# Patient Record
Sex: FEMALE | Race: WHITE | NOT HISPANIC OR LATINO | URBAN - METROPOLITAN AREA
[De-identification: names, ages, dates, MRNs, and addresses within clinical notes are randomized per-mention and may not be internally consistent; named-entity substitution may affect disease eponyms.]

---

## 2023-08-21 ENCOUNTER — HOSPITAL ENCOUNTER (OUTPATIENT)
Dept: DATA CONVERSION | Facility: HOSPITAL | Age: 83
End: 2023-08-21

## 2023-08-21 DIAGNOSIS — I34.1 NONRHEUMATIC MITRAL (VALVE) PROLAPSE: ICD-10-CM

## 2024-02-09 ENCOUNTER — HOSPITAL ENCOUNTER (OUTPATIENT)
Dept: RADIOLOGY | Facility: HOSPITAL | Age: 84
Discharge: HOME | End: 2024-02-09
Payer: MEDICARE

## 2024-02-09 DIAGNOSIS — Z78.0 ASYMPTOMATIC MENOPAUSAL STATE: ICD-10-CM

## 2024-02-09 PROCEDURE — 77085 DXA BONE DENSITY AXL VRT FX: CPT

## 2024-02-12 ENCOUNTER — APPOINTMENT (OUTPATIENT)
Dept: CARDIOLOGY | Facility: HOSPITAL | Age: 84
End: 2024-02-12
Payer: MEDICARE

## 2024-05-24 ENCOUNTER — HOSPITAL ENCOUNTER (OUTPATIENT)
Dept: RADIOLOGY | Facility: HOSPITAL | Age: 84
Discharge: HOME | End: 2024-05-24
Payer: MEDICARE

## 2024-05-24 DIAGNOSIS — R10.11 RIGHT UPPER QUADRANT PAIN: ICD-10-CM

## 2024-05-24 PROCEDURE — 76700 US EXAM ABDOM COMPLETE: CPT | Performed by: RADIOLOGY

## 2024-05-24 PROCEDURE — 76700 US EXAM ABDOM COMPLETE: CPT

## 2024-07-24 ENCOUNTER — HOSPITAL ENCOUNTER (EMERGENCY)
Facility: HOSPITAL | Age: 84
Discharge: HOME | End: 2024-07-24
Attending: EMERGENCY MEDICINE
Payer: MEDICARE

## 2024-07-24 ENCOUNTER — APPOINTMENT (OUTPATIENT)
Dept: RADIOLOGY | Facility: HOSPITAL | Age: 84
End: 2024-07-24
Payer: MEDICARE

## 2024-07-24 ENCOUNTER — APPOINTMENT (OUTPATIENT)
Dept: CARDIOLOGY | Facility: HOSPITAL | Age: 84
End: 2024-07-24
Payer: MEDICARE

## 2024-07-24 VITALS
HEART RATE: 68 BPM | HEIGHT: 61 IN | DIASTOLIC BLOOD PRESSURE: 67 MMHG | RESPIRATION RATE: 18 BRPM | OXYGEN SATURATION: 98 % | SYSTOLIC BLOOD PRESSURE: 127 MMHG | TEMPERATURE: 98.1 F | BODY MASS INDEX: 19.83 KG/M2 | WEIGHT: 105 LBS

## 2024-07-24 DIAGNOSIS — R06.00 DYSPNEA, UNSPECIFIED TYPE: Primary | ICD-10-CM

## 2024-07-24 DIAGNOSIS — R53.1 GENERALIZED WEAKNESS: ICD-10-CM

## 2024-07-24 LAB
ALBUMIN SERPL-MCNC: 3.9 G/DL (ref 3.5–5)
ALP BLD-CCNC: 63 U/L (ref 35–125)
ALT SERPL-CCNC: 22 U/L (ref 5–40)
ANION GAP SERPL CALC-SCNC: 12 MMOL/L
APPEARANCE UR: CLEAR
AST SERPL-CCNC: 21 U/L (ref 5–40)
BASOPHILS # BLD AUTO: 0.02 X10*3/UL (ref 0–0.1)
BASOPHILS NFR BLD AUTO: 0.3 %
BILIRUB SERPL-MCNC: 0.3 MG/DL (ref 0.1–1.2)
BILIRUB UR STRIP.AUTO-MCNC: NEGATIVE MG/DL
BUN SERPL-MCNC: 31 MG/DL (ref 8–25)
CALCIUM SERPL-MCNC: 9.5 MG/DL (ref 8.5–10.4)
CHLORIDE SERPL-SCNC: 96 MMOL/L (ref 97–107)
CO2 SERPL-SCNC: 26 MMOL/L (ref 24–31)
COLOR UR: ABNORMAL
CREAT SERPL-MCNC: 1.2 MG/DL (ref 0.4–1.6)
EGFRCR SERPLBLD CKD-EPI 2021: 44 ML/MIN/1.73M*2
EOSINOPHIL # BLD AUTO: 0.12 X10*3/UL (ref 0–0.4)
EOSINOPHIL NFR BLD AUTO: 1.9 %
ERYTHROCYTE [DISTWIDTH] IN BLOOD BY AUTOMATED COUNT: 13.3 % (ref 11.5–14.5)
GLUCOSE SERPL-MCNC: 85 MG/DL (ref 65–99)
GLUCOSE UR STRIP.AUTO-MCNC: NORMAL MG/DL
HCT VFR BLD AUTO: 42.6 % (ref 36–46)
HGB BLD-MCNC: 14.5 G/DL (ref 12–16)
IMM GRANULOCYTES # BLD AUTO: 0.06 X10*3/UL (ref 0–0.5)
IMM GRANULOCYTES NFR BLD AUTO: 0.9 % (ref 0–0.9)
KETONES UR STRIP.AUTO-MCNC: NEGATIVE MG/DL
LACTATE BLDV-SCNC: 1.5 MMOL/L (ref 0.4–2)
LACTATE BLDV-SCNC: 2.2 MMOL/L (ref 0.4–2)
LEUKOCYTE ESTERASE UR QL STRIP.AUTO: ABNORMAL
LYMPHOCYTES # BLD AUTO: 1.99 X10*3/UL (ref 0.8–3)
LYMPHOCYTES NFR BLD AUTO: 31 %
MAGNESIUM SERPL-MCNC: 2.2 MG/DL (ref 1.6–3.1)
MCH RBC QN AUTO: 32.2 PG (ref 26–34)
MCHC RBC AUTO-ENTMCNC: 34 G/DL (ref 32–36)
MCV RBC AUTO: 95 FL (ref 80–100)
MONOCYTES # BLD AUTO: 0.87 X10*3/UL (ref 0.05–0.8)
MONOCYTES NFR BLD AUTO: 13.6 %
NEUTROPHILS # BLD AUTO: 3.36 X10*3/UL (ref 1.6–5.5)
NEUTROPHILS NFR BLD AUTO: 52.3 %
NITRITE UR QL STRIP.AUTO: NEGATIVE
NRBC BLD-RTO: 0 /100 WBCS (ref 0–0)
NT-PROBNP SERPL-MCNC: 167 PG/ML (ref 0–624)
PH UR STRIP.AUTO: 6.5 [PH]
PLATELET # BLD AUTO: 241 X10*3/UL (ref 150–450)
POTASSIUM SERPL-SCNC: 4.1 MMOL/L (ref 3.4–5.1)
PROT SERPL-MCNC: 6.4 G/DL (ref 5.9–7.9)
PROT UR STRIP.AUTO-MCNC: NEGATIVE MG/DL
RBC # BLD AUTO: 4.51 X10*6/UL (ref 4–5.2)
RBC # UR STRIP.AUTO: NEGATIVE /UL
RBC #/AREA URNS AUTO: NORMAL /HPF
SARS-COV-2 RNA RESP QL NAA+PROBE: NOT DETECTED
SODIUM SERPL-SCNC: 134 MMOL/L (ref 133–145)
SP GR UR STRIP.AUTO: 1.03
TROPONIN T SERPL-MCNC: 13 NG/L
TROPONIN T SERPL-MCNC: 14 NG/L
UROBILINOGEN UR STRIP.AUTO-MCNC: NORMAL MG/DL
WBC # BLD AUTO: 6.4 X10*3/UL (ref 4.4–11.3)
WBC #/AREA URNS AUTO: NORMAL /HPF

## 2024-07-24 PROCEDURE — 87635 SARS-COV-2 COVID-19 AMP PRB: CPT | Performed by: CLINICAL NURSE SPECIALIST

## 2024-07-24 PROCEDURE — 71275 CT ANGIOGRAPHY CHEST: CPT | Performed by: RADIOLOGY

## 2024-07-24 PROCEDURE — 93005 ELECTROCARDIOGRAM TRACING: CPT

## 2024-07-24 PROCEDURE — 87075 CULTR BACTERIA EXCEPT BLOOD: CPT | Mod: WESLAB,59 | Performed by: CLINICAL NURSE SPECIALIST

## 2024-07-24 PROCEDURE — 36415 COLL VENOUS BLD VENIPUNCTURE: CPT | Performed by: CLINICAL NURSE SPECIALIST

## 2024-07-24 PROCEDURE — 2550000001 HC RX 255 CONTRASTS: Performed by: EMERGENCY MEDICINE

## 2024-07-24 PROCEDURE — 81001 URINALYSIS AUTO W/SCOPE: CPT | Performed by: CLINICAL NURSE SPECIALIST

## 2024-07-24 PROCEDURE — 85025 COMPLETE CBC W/AUTO DIFF WBC: CPT | Performed by: CLINICAL NURSE SPECIALIST

## 2024-07-24 PROCEDURE — 80053 COMPREHEN METABOLIC PANEL: CPT | Performed by: CLINICAL NURSE SPECIALIST

## 2024-07-24 PROCEDURE — 87086 URINE CULTURE/COLONY COUNT: CPT | Mod: WESLAB | Performed by: CLINICAL NURSE SPECIALIST

## 2024-07-24 PROCEDURE — 83735 ASSAY OF MAGNESIUM: CPT | Performed by: CLINICAL NURSE SPECIALIST

## 2024-07-24 PROCEDURE — 84484 ASSAY OF TROPONIN QUANT: CPT | Performed by: CLINICAL NURSE SPECIALIST

## 2024-07-24 PROCEDURE — 99285 EMERGENCY DEPT VISIT HI MDM: CPT | Mod: 25

## 2024-07-24 PROCEDURE — 71046 X-RAY EXAM CHEST 2 VIEWS: CPT | Performed by: RADIOLOGY

## 2024-07-24 PROCEDURE — 87040 BLOOD CULTURE FOR BACTERIA: CPT | Mod: WESLAB | Performed by: CLINICAL NURSE SPECIALIST

## 2024-07-24 PROCEDURE — 83605 ASSAY OF LACTIC ACID: CPT | Performed by: CLINICAL NURSE SPECIALIST

## 2024-07-24 PROCEDURE — 71046 X-RAY EXAM CHEST 2 VIEWS: CPT

## 2024-07-24 PROCEDURE — 83880 ASSAY OF NATRIURETIC PEPTIDE: CPT | Performed by: CLINICAL NURSE SPECIALIST

## 2024-07-24 PROCEDURE — 71275 CT ANGIOGRAPHY CHEST: CPT

## 2024-07-24 ASSESSMENT — LIFESTYLE VARIABLES
HAVE YOU EVER FELT YOU SHOULD CUT DOWN ON YOUR DRINKING: NO
EVER HAD A DRINK FIRST THING IN THE MORNING TO STEADY YOUR NERVES TO GET RID OF A HANGOVER: NO
EVER FELT BAD OR GUILTY ABOUT YOUR DRINKING: NO
HAVE PEOPLE ANNOYED YOU BY CRITICIZING YOUR DRINKING: NO
TOTAL SCORE: 0

## 2024-07-24 ASSESSMENT — PAIN SCALES - GENERAL: PAINLEVEL_OUTOF10: 0 - NO PAIN

## 2024-07-24 ASSESSMENT — COLUMBIA-SUICIDE SEVERITY RATING SCALE - C-SSRS
6. HAVE YOU EVER DONE ANYTHING, STARTED TO DO ANYTHING, OR PREPARED TO DO ANYTHING TO END YOUR LIFE?: NO
2. HAVE YOU ACTUALLY HAD ANY THOUGHTS OF KILLING YOURSELF?: NO
1. IN THE PAST MONTH, HAVE YOU WISHED YOU WERE DEAD OR WISHED YOU COULD GO TO SLEEP AND NOT WAKE UP?: NO

## 2024-07-24 ASSESSMENT — PAIN - FUNCTIONAL ASSESSMENT: PAIN_FUNCTIONAL_ASSESSMENT: 0-10

## 2024-07-24 NOTE — ED PROVIDER NOTES
Department of Emergency Medicine   ED  Provider Note  Admit Date/RoomTime: 7/24/2024  1:43 PM  ED Room: 12/12        History of Present Illness:  Chief Complaint   Patient presents with    Weakness, Gen    Shortness of Breath         Erin Calabrese is a 83 y.o. female acid reflux presents to the emergency department complaints of shortness of breath generalized weakness.  Patient states she was seen evaluated by her primary care physician last week for back pain.  Took Toradol and the back pain resolved.  Then she started having shortness of breath where she had to force herself to move.  Is progressively gotten worse.  She had a decrease of her appetite last week but felt hungry couple days ago and then today woke up and had extreme shortness of breath extreme weakness and she had to force herself to eat.  She denies chest pain no itchy watery eyes runny nose sore throat complains of bilateral ears feeling clogged.  No abdominal pain nausea or vomiting.  No pressure burning or frequency of urination.  Patient reports that she has not eaten well so she has had a 5 pound weight loss.  Denies any swelling in her arms or legs.  Patient started to get sick on July 6 states that she had COVID at that time.  She has not bounced back to her normal state of health since then.  Per family    Review of Systems:   Pertinent positives and negatives are stated within HPI, all other systems reviewed and are negative.        --------------------------------------------- PAST HISTORY ---------------------------------------------  Past Medical History:  has a past medical history of Other specified health status.  Past Surgical History:  has a past surgical history that includes Other surgical history (11/04/2022).  Social History:    Family History: family history is not on file.. Unless otherwise noted, family history is non contributory  The patient’s home medications have been reviewed.  Allergies: Patient has no known  "allergies.        ---------------------------------------------------PHYSICAL EXAM--------------------------------------    GENERAL APPEARANCE: Awake and alert.   VITAL SIGNS: As per the nurses' triage record.   HEENT: Normocephalic, atraumatic. Extraocular muscles are intact. Pupils equal round and reactive to light. Conjunctiva are pink. Negative scleral icterus. Mucous membranes are moist. Tongue in the midline. Pharynx was without erythema or exudates, uvula midline left panic membrane pearly gray and intact right tympanic membrane dull and intact.  NECK: Soft Nontender and supple, full gross ROM, no meningeal signs.  CHEST: Nontender to palpation. Clear to auscultation bilaterally. No rales, rhonchi, or wheezing.  Tachypnea noted with talking but able to talk in complete sentence no use of accessory muscles or nasal flaring noted no pursed of breathing or tripod position noted.  HEART: S1, S2. Regular rate and rhythm. No murmurs, gallops or rubs.  Strong and equal pulses in the extremities.   ABDOMEN: Soft, nontender, nondistended, positive bowel sounds, no palpable masses.  MUSCULCSKELETAL: The calves are nontender to palpation. Full gross active range of motion.  No peripheral edema noted.  NEUROLOGICAL: Awake, alert and oriented x 3. Power intact in the upper and lower extremities. Sensation is intact to light touch in the upper and lower extremities.   IMMUNOLOGICAL: No lymphatic streaking noted   DERM: No petechiae, rashes, or ecchymoses.          ------------------------- NURSING NOTES AND VITALS REVIEWED ---------------------------  The nursing notes within the ED encounter and vital signs as below have been reviewed by myself  /73   Pulse 78   Temp 36.7 °C (98.1 °F)   Resp 16   Ht 1.549 m (5' 1\")   Wt 47.6 kg (105 lb)   SpO2 99%   BMI 19.84 kg/m²     Oxygen Saturation Interpretation: 99% room air    The cardiac monitor revealed sinus rhythm with a heart rate in the 70s as interpreted by me. " The cardiac monitor was ordered secondary to the patient's heart rate and to monitor the patient for dysrhythmia.       The patient’s available past medical records and past encounters were reviewed.          -----------------------DIAGNOSTIC RESULTS------------------------  LABS:    Labs Reviewed   CBC WITH AUTO DIFFERENTIAL - Abnormal       Result Value    WBC 6.4      nRBC 0.0      RBC 4.51      Hemoglobin 14.5      Hematocrit 42.6      MCV 95      MCH 32.2      MCHC 34.0      RDW 13.3      Platelets 241      Neutrophils % 52.3      Immature Granulocytes %, Automated 0.9      Lymphocytes % 31.0      Monocytes % 13.6      Eosinophils % 1.9      Basophils % 0.3      Neutrophils Absolute 3.36      Immature Granulocytes Absolute, Automated 0.06      Lymphocytes Absolute 1.99      Monocytes Absolute 0.87 (*)     Eosinophils Absolute 0.12      Basophils Absolute 0.02     COMPREHENSIVE METABOLIC PANEL - Abnormal    Glucose 85      Sodium 134      Potassium 4.1      Chloride 96 (*)     Bicarbonate 26      Urea Nitrogen 31 (*)     Creatinine 1.20      eGFR 44 (*)     Calcium 9.5      Albumin 3.9      Alkaline Phosphatase 63      Total Protein 6.4      AST 21      Bilirubin, Total 0.3      ALT 22      Anion Gap 12     BLOOD GAS LACTIC ACID, VENOUS - Abnormal    POCT Lactate, Venous 2.2 (*)    N-TERMINAL PROBNP - Normal    PROBNP 167      Narrative:     Reference ranges are based on clinical submission data. These ranges represent the 95th percentile of normal cut-off points. As NT Pro- BNP values approach 1000 pg/ml, clinical symptoms are more likely associated with CHF.   SARS-COV-2 PCR - Normal    Coronavirus 2019, PCR Not Detected      Narrative:     This assay has received FDA Emergency Use Authorization (EUA) and is only authorized for the duration of time that circumstances exist to justify the authorization of the emergency use of in vitro diagnostic tests for the detection of SARS-CoV-2 virus and/or diagnosis of  COVID-19 infection under section 564(b)(1) of the Act, 21 U.S.C. 360bbb-3(b)(1). This assay is an in vitro diagnostic nucleic acid amplification test for the qualitative detection of SARS-CoV-2 from nasopharyngeal specimens and has been validated for use at [facility]. Negative results do not preclude COVID-19 infections and should not be used as the sole basis for diagnosis, treatment, or other management decisions.     SERIAL TROPONIN, INITIAL ([facility]) - Normal    Troponin T, High Sensitivity 13     SERIAL TROPONIN,  2 HOUR ([facility]) - Normal    Troponin T, High Sensitivity 14     MAGNESIUM - Normal    Magnesium 2.20     BLOOD GAS LACTIC ACID, VENOUS - Normal    POCT Lactate, Venous 1.5     BLOOD CULTURE   BLOOD CULTURE   TROPONIN T SERIES, HIGH SENSITIVITY (0, 2 HR, 6 HR)    Narrative:     The following orders were created for panel order Troponin T Series, High Sensitivity (0, 2HR, 6HR).  Procedure                               Abnormality         Status                     ---------                               -----------         ------                     Serial Troponin, Initial...[[id number]]  Normal              Final result               Serial Troponin, 2 Hour ...[[id number]]  Normal              Final result               Serial Troponin, 6 Hour ...[[id number]]                                                   Please view results for these tests on the individual orders.   URINALYSIS WITH REFLEX CULTURE AND MICROSCOPIC    Narrative:     The following orders were created for panel order Urinalysis with Reflex Culture and Microscopic.  Procedure                               Abnormality         Status                     ---------                               -----------         ------                     Urinalysis with Reflex C...[[id number]]                                                 Extra Urine Gray Tube[[id number]]                                                         Please  view results for these tests on the individual orders.   URINALYSIS WITH REFLEX CULTURE AND MICROSCOPIC   EXTRA URINE GRAY TUBE       As interpreted by me, the above displayed labs are abnormal. All other labs obtained during this visit were within normal range or not returned as of this dictation.      EKG Interpretation    1400 EKG personally interpreted by me performed at 1357  Normal sinus rhythm with a ventricular rate 73 normal axis and intervals no acute ischemic changes [EF]           XR chest 2 views   Final Result   No acute cardiopulmonary process.        MACRO:   None        Signed by: Annabelle Valverde 7/24/2024 2:51 PM   Dictation workstation:   [id number]      CT angio chest for pulmonary embolism    (Results Pending)           XR chest 2 views   Final Result   No acute cardiopulmonary process.        MACRO:   None        Signed by: Annabelle Valverde 7/24/2024 2:51 PM   Dictation workstation:   [id number]      CT angio chest for pulmonary embolism    (Results Pending)           ------------------------------ ED COURSE/MEDICAL DECISION MAKING----------------------  Medical Decision Making:   Exam: A medically appropriate exam performed, outlined above, given the known history and presentation.    History obtained from: Review of medical record nursing notes patient patient family      Social Determinants of Health considered during this visit: Takes care of her self at home      PAST MEDICAL HISTORY/Chronic Conditions Affecting Care     has a past medical history of Other specified health status.       CC/HPI Summary, Social Determinants of health, Records Reviewed, DDx, testing done/not done, ED Course, Reassessment, disposition considerations/shared decision making with patient, consults, disposition:   Presents with shortness of breath generalized weakness  Plan  Chest x-ray No acute cardiopulmonary process   EKG  Blood cultures  Lactic acid  CBC  CMP  proBNP  COVID  Troponin  Urine    Medical  Decision Making/Differential Diagnosis:  Differentials include but not limited to viral illness versus pneumonia versus CHF versus acute coronary syndrome versus electrode abnormality versus dehydration versus anemia    Patient presented with shortness of breath tachypnea with talking.  Chest x-ray showed no acute cardiopulmonary process  Troponin negative no elevation white blood cell count proBNP negative EKG per attending note normal sinus rhythm no ST elevation or arrhythmia COVID-negative magnesium within normal limits electrolytes within normal limits except for chloride slightly low BUN mildly elevated creatinine at baseline LFTs within normal limits patient presenting in position of comfort.  No obvious source of her shortness of breath CT angio ordered Case discussed and patient seen evaluated with attending physician Dr. Oshea   Please see attending note for final impression disposition reevaluation she will be assuming care of patient for final plan due to the end of my shift [id number]      PROCEDURES  Unless otherwise noted below, none      CONSULTS:   None      ED Course as of 07/24/24 1755   Wed Jul 24, 2024   1400 EKG personally interpreted by me performed at 1357  Normal sinus rhythm with a ventricular rate 73 normal axis and intervals no acute ischemic changes [EF]      ED Course User Index  [EF] Astrid Oshea,          Diagnoses as of 07/24/24 1755   Dyspnea, unspecified type   Generalized weakness         This patient has remained hemodynamically stable during their ED course.      Critical Care: none        Counseling:  The emergency provider has spoken with the patient and discussed today’s results, in addition to providing specific details for the plan of care and counseling regarding the diagnosis and prognosis.  Questions are answered at this time and they are agreeable with the plan.         --------------------------------- IMPRESSION AND DISPOSITION  ---------------------------------    IMPRESSION  1. Dyspnea, unspecified type    2. Generalized weakness        DISPOSITION  Disposition:  pending  Patient condition is stable        NOTE: This report was transcribed using voice recognition software. Every effort was made to ensure accuracy; however, inadvertent computerized transcription errors may be present      Maria Del Rosario Wooten, [email or web address]  07/24/24 8911

## 2024-07-25 LAB
ATRIAL RATE: 73 BPM
BACTERIA UR CULT: NORMAL
HOLD SPECIMEN: NORMAL
P AXIS: 79 DEGREES
P OFFSET: 208 MS
P ONSET: 153 MS
PR INTERVAL: 144 MS
Q ONSET: 225 MS
QRS COUNT: 12 BEATS
QRS DURATION: 82 MS
QT INTERVAL: 392 MS
QTC CALCULATION(BAZETT): 431 MS
QTC FREDERICIA: 418 MS
R AXIS: 46 DEGREES
T AXIS: 64 DEGREES
T OFFSET: 421 MS
VENTRICULAR RATE: 73 BPM

## 2024-07-28 LAB
BACTERIA BLD CULT: NORMAL
BACTERIA BLD CULT: NORMAL

## 2025-06-23 ENCOUNTER — HOSPITAL ENCOUNTER (OUTPATIENT)
Dept: RADIOLOGY | Facility: HOSPITAL | Age: 85
Discharge: HOME | End: 2025-06-23
Payer: MEDICARE

## 2025-06-23 DIAGNOSIS — M25.512 PAIN IN LEFT SHOULDER: ICD-10-CM

## 2025-06-23 DIAGNOSIS — M25.412 EFFUSION, LEFT SHOULDER: ICD-10-CM

## 2025-06-23 PROCEDURE — 73030 X-RAY EXAM OF SHOULDER: CPT | Mod: LEFT SIDE | Performed by: RADIOLOGY

## 2025-06-23 PROCEDURE — 73030 X-RAY EXAM OF SHOULDER: CPT | Mod: LT

## 2025-07-07 ENCOUNTER — APPOINTMENT (OUTPATIENT)
Facility: CLINIC | Age: 85
End: 2025-07-07
Payer: MEDICARE

## 2025-07-07 VITALS
HEIGHT: 61 IN | HEART RATE: 76 BPM | DIASTOLIC BLOOD PRESSURE: 74 MMHG | BODY MASS INDEX: 19.84 KG/M2 | SYSTOLIC BLOOD PRESSURE: 124 MMHG | RESPIRATION RATE: 16 BRPM

## 2025-07-07 DIAGNOSIS — R25.2 MUSCLE CRAMPS: Primary | ICD-10-CM

## 2025-07-07 DIAGNOSIS — E55.9 VITAMIN D DEFICIENCY: ICD-10-CM

## 2025-07-07 PROCEDURE — 1036F TOBACCO NON-USER: CPT | Performed by: INTERNAL MEDICINE

## 2025-07-07 PROCEDURE — 99213 OFFICE O/P EST LOW 20 MIN: CPT | Performed by: INTERNAL MEDICINE

## 2025-07-07 PROCEDURE — 1126F AMNT PAIN NOTED NONE PRSNT: CPT | Performed by: INTERNAL MEDICINE

## 2025-07-07 PROCEDURE — 1159F MED LIST DOCD IN RCRD: CPT | Performed by: INTERNAL MEDICINE

## 2025-07-07 PROCEDURE — 1160F RVW MEDS BY RX/DR IN RCRD: CPT | Performed by: INTERNAL MEDICINE

## 2025-07-07 PROCEDURE — 99203 OFFICE O/P NEW LOW 30 MIN: CPT | Performed by: INTERNAL MEDICINE

## 2025-07-07 RX ORDER — RALOXIFENE HYDROCHLORIDE 60 MG/1
1 TABLET ORAL DAILY
COMMUNITY
Start: 2020-07-08

## 2025-07-07 RX ORDER — PANTOPRAZOLE SODIUM 40 MG/1
TABLET, DELAYED RELEASE ORAL
COMMUNITY
Start: 2025-06-02

## 2025-07-07 RX ORDER — ROPINIROLE 1 MG/1
TABLET, FILM COATED ORAL
COMMUNITY
Start: 2025-02-12

## 2025-07-07 RX ORDER — IPRATROPIUM BROMIDE 42 UG/1
SPRAY, METERED NASAL
COMMUNITY
Start: 2020-07-08

## 2025-07-07 ASSESSMENT — ENCOUNTER SYMPTOMS
NAUSEA: 0
LOSS OF SENSATION IN FEET: 0
DEPRESSION: 0
FEVER: 0
PALPITATIONS: 0
OCCASIONAL FEELINGS OF UNSTEADINESS: 0
HEADACHES: 0
DIARRHEA: 0
VOMITING: 0
COUGH: 0
FATIGUE: 0
SHORTNESS OF BREATH: 0
CHILLS: 0

## 2025-07-07 ASSESSMENT — PATIENT HEALTH QUESTIONNAIRE - PHQ9
1. LITTLE INTEREST OR PLEASURE IN DOING THINGS: NOT AT ALL
2. FEELING DOWN, DEPRESSED OR HOPELESS: NOT AT ALL
SUM OF ALL RESPONSES TO PHQ9 QUESTIONS 1 AND 2: 0

## 2025-07-07 ASSESSMENT — PAIN SCALES - GENERAL: PAINLEVEL_OUTOF10: 0-NO PAIN

## 2025-07-07 NOTE — PROGRESS NOTES
Endocrinology New Patient Consult  Subjective   Patient ID: Erin Calabrese is a 84 y.o. female who presents for Leg Cramps. Patient is a self referral.     PCP: Joseph Goldsmith MD    HPI  84-year-old self-referred for evaluation of muscle cramps.  Patient states she has had muscle cramps in her lower extremities primarily at night for many years.  She has discussed it with her primary care who prescribed ropinirole recently but she refused to take it.  She knows she has restless leg but she thinks this is unrelated.  She does take various supplements that include calcium vitamin D and magnesium but she is unable to give me details.  She believes she had blood work done last year with no pertinent findings.  She is thinking about seeing a hrt specialist.  She does not have hot flashes.  She has been on Evista for several years for osteoporosis prior to that she was on Fosamax    Review of Systems   Constitutional:  Negative for chills, fatigue and fever.   Respiratory:  Negative for cough and shortness of breath.    Cardiovascular:  Negative for chest pain and palpitations.   Gastrointestinal:  Negative for diarrhea, nausea and vomiting.   Neurological:  Negative for headaches.       Problem List[1]    Medical History[2]     Surgical History[3]     Tobacco Use History[4]   Social History     Substance and Sexual Activity   Alcohol Use Yes    Alcohol/week: 7.0 - 10.0 standard drinks of alcohol    Types: 7 - 10 Standard drinks or equivalent per week      Marital status:   Employment: Retired women's     Family History[5]     Home Meds:  Current Outpatient Medications   Medication Instructions    Evista 60 mg tablet 1 tablet, Daily    ipratropium (Atrovent) 42 mcg (0.06 %) nasal spray Administer into affected nostril(s).    pantoprazole (ProtoNix) 40 mg EC tablet     rOPINIRole (Requip) 1 mg tablet         RX Allergies[6]     Objective   Vitals:    07/07/25 1144   BP: 124/74   Pulse: 76   Resp: 16  "     Vitals:      Body mass index is 19.84 kg/m².   Physical Exam  Constitutional:       Appearance: Normal appearance. She is normal weight.   HENT:      Head: Normocephalic and atraumatic.   Neck:      Thyroid: No thyroid mass, thyromegaly or thyroid tenderness.   Cardiovascular:      Rate and Rhythm: Normal rate and regular rhythm.      Heart sounds: No murmur heard.     No gallop.   Pulmonary:      Effort: Pulmonary effort is normal.      Breath sounds: Normal breath sounds.   Abdominal:      Palpations: Abdomen is soft.      Comments: benign   Neurological:      General: No focal deficit present.      Mental Status: She is alert and oriented to person, place, and time.      Deep Tendon Reflexes: Reflexes are normal and symmetric.   Psychiatric:         Behavior: Behavior is cooperative.         Labs:  No results found for: \"CMPLAS\", \"HGBA1C\", \"A1CTI\", \"LDLDIRECT\", \"TSH\", \"FREET4\", \"TROJI8K\"   No results found for: \"PR1\", \"THYROIDPAB\", \"TSI\"     Assessment/Plan   Assessment & Plan  Muscle cramps    Orders:    Comprehensive Metabolic Panel; Future    TSH with reflex to Free T4 if abnormal; Future    Vitamin D 25-Hydroxy,Total (for eval of Vitamin D levels); Future    Magnesium; Future    Vitamin D deficiency    Orders:    Vitamin D 25-Hydroxy,Total (for eval of Vitamin D levels); Future    84-year-old here for evaluation of muscle cramps.  I discussed her complaints with her today.  We discussed limited hormonal causes of leg cramps.  I did recommend electrolytes plus thyroid blood work which we will do in the near future.  We did discuss leg cramps can be a side effect of raloxifene so I would recommend at least a trial off of it for a bit to see if her leg cramps improve otherwise I encouraged her to follow-up with her primary care if blood work is normal  Electronically signed by:  Yu Valero MD 07/07/25  12:28 PM         [1]   Patient Active Problem List  Diagnosis    Dyspnea    Generalized weakness   [2] "   Past Medical History:  Diagnosis Date    Other specified health status     Known health problems: none   [3]   Past Surgical History:  Procedure Laterality Date    OTHER SURGICAL HISTORY  11/04/2022    Cholecystectomy   [4]   Social History  Tobacco Use   Smoking Status Never   Smokeless Tobacco Never   [5] No family history on file.  [6] No Known Allergies     Chest pain, feeling of food getting stuck in throat

## 2025-07-07 NOTE — PATIENT INSTRUCTIONS
Blood work in the near future  Consider trial off of raloxifene to see if leg cramps improve  Follow-up as needed if blood work is normal  Please call or message with concerns or questions

## 2025-07-08 LAB
25(OH)D3+25(OH)D2 SERPL-MCNC: 36 NG/ML (ref 30–100)
ALBUMIN SERPL-MCNC: 4.1 G/DL (ref 3.6–5.1)
ALP SERPL-CCNC: 43 U/L (ref 37–153)
ALT SERPL-CCNC: 13 U/L (ref 6–29)
ANION GAP SERPL CALCULATED.4IONS-SCNC: 11 MMOL/L (CALC) (ref 7–17)
AST SERPL-CCNC: 19 U/L (ref 10–35)
BILIRUB SERPL-MCNC: 0.5 MG/DL (ref 0.2–1.2)
BUN SERPL-MCNC: 17 MG/DL (ref 7–25)
CALCIUM SERPL-MCNC: 9.2 MG/DL (ref 8.6–10.4)
CHLORIDE SERPL-SCNC: 102 MMOL/L (ref 98–110)
CO2 SERPL-SCNC: 26 MMOL/L (ref 20–32)
CREAT SERPL-MCNC: 1.01 MG/DL (ref 0.6–0.95)
EGFRCR SERPLBLD CKD-EPI 2021: 54 ML/MIN/1.73M2
GLUCOSE SERPL-MCNC: 68 MG/DL (ref 65–139)
MAGNESIUM SERPL-MCNC: 2.2 MG/DL (ref 1.5–2.5)
POTASSIUM SERPL-SCNC: 4.7 MMOL/L (ref 3.5–5.3)
PROT SERPL-MCNC: 6.4 G/DL (ref 6.1–8.1)
SODIUM SERPL-SCNC: 139 MMOL/L (ref 135–146)
TSH SERPL-ACNC: 1.76 MIU/L (ref 0.4–4.5)

## 2025-09-02 ENCOUNTER — HOSPITAL ENCOUNTER (OUTPATIENT)
Dept: RADIOLOGY | Facility: CLINIC | Age: 85
Discharge: HOME | End: 2025-09-02
Payer: MEDICARE

## 2025-09-02 DIAGNOSIS — R10.824 LEFT LOWER QUADRANT REBOUND ABDOMINAL TENDERNESS: ICD-10-CM

## 2025-09-02 PROCEDURE — 74177 CT ABD & PELVIS W/CONTRAST: CPT

## 2025-09-02 PROCEDURE — 2550000001 HC RX 255 CONTRASTS

## 2025-09-02 PROCEDURE — 74177 CT ABD & PELVIS W/CONTRAST: CPT | Performed by: STUDENT IN AN ORGANIZED HEALTH CARE EDUCATION/TRAINING PROGRAM

## 2025-09-02 RX ADMIN — IOHEXOL 75 ML: 350 INJECTION, SOLUTION INTRAVENOUS at 14:26
